# Patient Record
Sex: MALE | Race: BLACK OR AFRICAN AMERICAN | Employment: OTHER | URBAN - METROPOLITAN AREA
[De-identification: names, ages, dates, MRNs, and addresses within clinical notes are randomized per-mention and may not be internally consistent; named-entity substitution may affect disease eponyms.]

---

## 2022-02-21 ENCOUNTER — TELEPHONE (OUTPATIENT)
Dept: PHYSICAL THERAPY | Age: 62
End: 2022-02-21

## 2022-02-21 ENCOUNTER — APPOINTMENT (OUTPATIENT)
Dept: PHYSICAL THERAPY | Age: 62
End: 2022-02-21

## 2022-12-23 ENCOUNTER — HOSPITAL ENCOUNTER (OUTPATIENT)
Dept: PHYSICAL THERAPY | Age: 62
Discharge: HOME | End: 2022-12-23
Payer: OTHER GOVERNMENT

## 2022-12-23 PROCEDURE — 97110 THERAPEUTIC EXERCISES: CPT

## 2022-12-23 PROCEDURE — 97535 SELF CARE MNGMENT TRAINING: CPT

## 2022-12-23 PROCEDURE — 97162 PT EVAL MOD COMPLEX 30 MIN: CPT

## 2022-12-23 NOTE — PROGRESS NOTES
PT DAILY TREATMENT NOTE     Patient Name: Tj Joel  Date:2022  : 1960  [x]  Patient  Verified  Payor: PAM / Plan: Joey Shoemaker 74 / Product Type: Phuc Salazar /    In [id number]  Out WFM  Total Treatment Time (min): 40  Visit #: 1 of 8    Treatment Area: Pain in left knee [M25.562]    SUBJECTIVE  Pain Level (0-10 scale):  2  Any medication changes, allergies to medications, adverse drug reactions, diagnosis change, or new procedure performed?: [x] No    [] Yes (see summary sheet for update)  Subjective functional status/changes:   [] No changes reported       OBJECTIVE    Modality rationale:    Min Type Additional Details    [] Estim:  []Unatt       []IFC  []Premod                        []Other:  []w/ice   []w/heat  Position:  Location:    [] Estim: []Att    []TENS instruct  []NMES                    []Other:  []w/US   []w/ice   []w/heat  Position:  Location:    []  Traction: [] Cervical       []Lumbar                       [] Prone          []Supine                       []Intermittent   []Continuous Lbs:  [] before manual  [] after manual    []  Ultrasound: []Continuous   [] Pulsed                           []1MHz   []3MHz W/cm2:  Location:    []  Iontophoresis with dexamethasone         Location: [] Take home patch   [] In clinic    []  Ice     []  heat  []  Ice massage  []  Laser   []  Anodyne Position:  Location:    []  Laser with stim  []  Other:  Position:  Location:    []  Vasopneumatic Device    []  Right     []  Left  Pre-treatment girth:  Post-treatment girth:  Measured at (location):  Pressure:       [] lo [] med [] hi   Temperature: [] lo [] med [] hi   [] Skin assessment post-treatment:  []intact []redness- no adverse reaction    []redness - adverse reaction:     20 min [x]Eval                  []Re-Eval       12 min Therapeutic Exercise:  [] See flow sheet :HEP   Rationale: increase ROM and increase strength to improve the patients ability to perform ADL      8 min Self Care/Home Management: activity modification, pain managemeng   Rationale:  decrease strain and pain   to improve the patients ability to perform ADL          With   [] TE   [] TA   [] neuro   [] other: Patient Education: [x] Review HEP    [] Progressed/Changed HEP based on:   [] positioning   [] body mechanics   [] transfers   [] heat/ice application    [] other:      Other Objective/Functional Measures:       Pain Level (0-10 scale) post treatment: 2    ASSESSMENT/Changes in Function:      Patient will continue to benefit from skilled PT services to modify and progress therapeutic interventions, address functional mobility deficits, address ROM deficits, address strength deficits, analyze and address soft tissue restrictions, and analyze and cue movement patterns to attain remaining goals. [x]  See Plan of Care  []  See progress note/recertification  []  See Discharge Summary         Progress towards goals / Updated goals:  Short Term Goals: To be accomplished in 1 weeks:   1. The pt will be I and complaint with HEP    IE- issued HEP   Long Term Goals: To be accomplished in 4 weeks:   1. Improve FOTO score to 71 to improve ability for functional activities   IE- 65   2. Improve left glute max strength to 4+/5 to improve ability for functional activities   IE- 4-/5   3. The pt will report at least 50% improvement to improve ability for functional activities   IE- worsening. 4. The pt will demonstrate 4+/5 left glute med MMT for improved ability for gait. IE- 4/5   5.  The pt will report a little difficulty with walking a mile   IE- moderate    PLAN  []  Upgrade activities as tolerated     [x]  Continue plan of care  []  Update interventions per flow sheet       []  Discharge due to:_  []  Other:_      Juan rBeen, PT 12/23/2022  12:14 PM    Future Appointments   Date Time Provider Pa [place]   12/28/2022  3:00 PM Werner Sanders PTA [facility]   1/4/2023  5:30 PM Ruth Carmen Cortes, PTA [facility]   1/6/2023  7:00 AM Trenia Bracket, PTA [facility]   1/9/2023 11:30 AM Trenia Bracket, PTA [facility]   1/11/2023  5:00 PM Trenia Bracket, PTA [facility] [Patient Intake Form Reviewed] : Patient intake form was reviewed [As Noted in HPI] : as noted in HPI [Negative] : Heme/Lymph

## 2022-12-23 NOTE — PROGRESS NOTES
[facility]  [address] [facility] [address]  [place], [address]  [phone] fax    Plan of Care/ Statement of Necessity for Physical Therapy Services    Patient name: Aniceto López Start of Care: 2022   Referral source: Sherrel Jeans : 1960    Medical Diagnosis: Pain in left knee [M25.562]  Payor:  / Plan: Joey Shoemaker 76 / Product Type:  /  Onset [id number]    Treatment Diagnosis: left knee pain   Prior Hospitalization: see medical history Provider#: [id number]   Medications: Verified on Patient summary List    Comorbidities: back pain, GI disease, HTN, osteoporosis, headaches   Prior Level of Function: functionally I with all activities     The Plan of Care and following information is based on the information from the initial evaluation. Assessment/ key information:63 y/o male presents with c/o left knee pain that started about 1 year ago with no mechanism of injury. The pt reports pain increase with increased walking, stairs, squatting and lunging. Left knee AROM is WNL and equal to the right. MMT reveals weakness of B glute med, max and adductors. Limited flexibility is noted of B HS, quads, and hip flexors. + tenderness to palpation is noted over the patellar tendon and at the patellar pole. The pt will benefit from PT to address the aforementioned impairments. Evaluation Complexity History MEDIUM  Complexity : 1-2 comorbidities / personal factors will impact the outcome/ POC ; Examination HIGH Complexity : 4+ Standardized tests and measures addressing body structure, function, activity limitation and / or participation in recreation  ;Presentation MEDIUM Complexity : Evolving with changing characteristics  ; Clinical Decision Making MEDIUM Complexity : FOTO score of 26-74  Overall Complexity Rating: MEDIUM  Problem List: pain affecting function, decrease ROM, decrease strength, impaired gait/ balance, decrease ADL/ functional abilitiies, decrease activity tolerance, and decrease flexibility/ joint mobility   Treatment Plan may include any combination of the following: Therapeutic exercise, Neuromuscular reeducation, Manual therapy, Therapeutic activity, Self care/home management, Electric stim unattended , Ultrasound, and Electric stim attended  Patient / Family readiness to learn indicated by: asking questions, trying to perform skills, and interest  Persons(s) to be included in education: patient (P)  Barriers to Learning/Limitations: None  Patient Goal (s): To lessen the pain  Patient Self Reported Health Status: good  Rehabilitation Potential: good    Short Term Goals: To be accomplished in 1 weeks:   1. The pt will be I and complaint with HEP    IE- issued HEP   Long Term Goals: To be accomplished in 4 weeks:   1. Improve FOTO score to 71 to improve ability for functional activities   IE- 65   2. Improve left glute max strength to 4+/5 to improve ability for functional activities   IE- 4-/5   3. The pt will report at least 50% improvement to improve ability for functional activities   IE- worsening. 4. The pt will demonstrate 4+/5 left glute med MMT for improved ability for gait. IE- 4/5   5. The pt will report a little difficulty with walking a mile   IE- moderate    Frequency / Duration: Patient to be seen 2 times per week for 4 weeks. Patient/ Caregiver education and instruction: Diagnosis, prognosis, self care, activity modification, and exercises   [x]  Plan of care has been reviewed with LIO Fritz, PT 12/23/2022 12:17 PM    ________________________________________________________________________    I certify that the above Therapy Services are being furnished while the patient is under my care. I agree with the treatment plan and certify that this therapy is necessary.     Physician's Signature:____________Date:_________TIME:________     Elvia Ramos  ** Signature, Date and Time must be completed for valid certification **    Please sign and return to In [id number] [address]  [address] [facility] [address]  [place], [address]  [phone] fax

## 2022-12-28 ENCOUNTER — HOSPITAL ENCOUNTER (OUTPATIENT)
Dept: PHYSICAL THERAPY | Age: 62
Discharge: HOME | End: 2022-12-28
Payer: OTHER GOVERNMENT

## 2022-12-28 PROCEDURE — 97110 THERAPEUTIC EXERCISES: CPT

## 2022-12-28 PROCEDURE — 97112 NEUROMUSCULAR REEDUCATION: CPT

## 2022-12-28 PROCEDURE — 97140 MANUAL THERAPY 1/> REGIONS: CPT

## 2022-12-28 NOTE — PROGRESS NOTES
PT DAILY TREATMENT NOTE     Patient Name: Kemar Olivo  Date:2022  : 1960  [x]  Patient  Verified  Payor: PAM / Plan: Joey Shoemaker 74 / Product Type:  /    In time:3:00  Out time:3:43  Total Treatment Time (min): 43  Visit #: 2 of 8    Treatment Area: Pain in left knee [M25.562]    SUBJECTIVE  Pain Level (0-10 scale): 0.5/10  Any medication changes, allergies to medications, adverse drug reactions, diagnosis change, or new procedure performed?: [x] No    [] Yes (see summary sheet for update)  Subjective functional status/changes:   [x] No changes reported    OBJECTIVE    23 min Therapeutic Exercise:  [x] See flow sheet :   Rationale: increase ROM and increase strength to improve the patients ability to perform ADL's.     12 min Neuromuscular Re-education:  [x]  See flow sheet : Band-walks, dynamic step ups, KD dead lifts. Rationale: increase strength, improve coordination, and increase proprioception  to improve the patients ability to perform functional activities. 8 min Manual Therapy:  Graston technique to Left quads and patellar tendon, pin/stretch to Left quad. Pt seated. The manual therapy interventions were performed at a separate and distinct time from the therapeutic activities interventions. Rationale: decrease pain, increase ROM, increase tissue extensibility, and decrease trigger points to improve ease with negotiating up/down stairs. With   [x] TE   [] TA   [] neuro   [] other: Patient Education: [x] Review HEP    [] Progressed/Changed HEP based on:   [] positioning   [] body mechanics   [] transfers   [] heat/ice application    [] other:      Other Objective/Functional Measures: Initiated exercises per flow sheet. Pain Level (0-10 scale) post treatment: 0/10    ASSESSMENT/Changes in Function: First F/U visit. Pt fully participated in treatment. Responded well to treatment, reported a decrease in pain and tension. Reviewed HEP.  Continue PT to decrease mm/fascia restrictions and increase LE strength/stability to improve ease with performing functional activities. Patient will continue to benefit from skilled PT services to modify and progress therapeutic interventions, address functional mobility deficits, address ROM deficits, address strength deficits, analyze and address soft tissue restrictions, analyze and cue movement patterns, and analyze and modify body mechanics/ergonomics to attain remaining goals. [x]  See Plan of Care  []  See progress note/recertification  []  See Discharge Summary         Progress towards goals / Updated goals:  Short Term Goals: To be accomplished in 1 weeks:              1. The pt will be I and complaint with HEP               IE- issued HEP    Current: Progressing. 12/28/2022  Long Term Goals: To be accomplished in 4 weeks:              1. Improve FOTO score to 71 to improve ability for functional activities              IE- 65              2. Improve left glute max strength to 4+/5 to improve ability for functional activities              IE- 4-/5              3. The pt will report at least 50% improvement to improve ability for functional activities              IE- worsening. 4. The pt will demonstrate 4+/5 left glute med MMT for improved ability for gait.                IE- 4/5              5. The pt will report a little difficulty with walking a mile              IE- moderate    PLAN  []  Upgrade activities as tolerated     [x]  Continue plan of care  []  Update interventions per flow sheet       []  Discharge due to:_  []  Other:_      Bob Parkinson PTA 12/28/2022  2:59 PM    Future Appointments   Date Time Provider Pa [place]   12/28/2022  3:00 PM Justine Mendiola PTA [facility]   1/4/2023  5:30 PM Justine Mendiola PTA [facility]   1/6/2023  7:00 AM Justine Mendiola PTA [facility]   1/9/2023 11:30 AM Justine Mendiola PTA [facility]   1/11/2023  5:00 PM [facility] Car[facility]

## 2023-01-04 ENCOUNTER — HOSPITAL ENCOUNTER (OUTPATIENT)
Dept: PHYSICAL THERAPY | Age: 63
Discharge: HOME | End: 2023-01-04
Payer: OTHER GOVERNMENT

## 2023-01-04 PROCEDURE — 97140 MANUAL THERAPY 1/> REGIONS: CPT

## 2023-01-04 PROCEDURE — 97110 THERAPEUTIC EXERCISES: CPT

## 2023-01-04 PROCEDURE — 97112 NEUROMUSCULAR REEDUCATION: CPT

## 2023-01-04 NOTE — PROGRESS NOTES
PT DAILY TREATMENT NOTE     Patient Name: Michelle Sue  Date:2023  : 1960  [x]  Patient  Verified  Payor: PAM / Plan: Joey Shoemaker 74 / Product Type: Aleena Joshua /    In time:5:31  Out time:6:16  Total Treatment Time (min): 45  Visit #: 3 of 8    Treatment Area: Pain in left knee [M25.562]    SUBJECTIVE  Pain Level (0-10 scale): 0/10  Any medication changes, allergies to medications, adverse drug reactions, diagnosis change, or new procedure performed?: [x] No    [] Yes (see summary sheet for update)  Subjective functional status/changes:   [] No changes reported  \"I felt pretty good until I did some yard work on Monday. It's store today. \"    OBJECTIVE    25 min Therapeutic Exercise:  [x] See flow sheet :   Rationale: increase ROM and increase strength to improve the patients ability to perform ADL's.    12 min Neuromuscular Re-education:  [x]  See flow sheet : Band-walks, dynamic step ups, KD dead lifts. Rationale: increase strength, improve coordination, and increase proprioception  to improve the patients ability to perform functional activities. 8 min Manual Therapy:  Graston technique to Left quads and patellar tendon, pin/stretch to Left quad. Pt seated. The manual therapy interventions were performed at a separate and distinct time from the therapeutic activities interventions. Rationale: decrease pain, increase ROM, increase tissue extensibility, and decrease trigger points to improve ease with performing functional activities. With   [x] TE   [] TA   [] neuro   [] other: Patient Education: [x] Review HEP    [] Progressed/Changed HEP based on:   [] positioning   [] body mechanics   [] transfers   [] heat/ice application    [] other:      Other Objective/Functional Measures: No change in there ex. Pt presented with increased Left knee pain, pt attributes increased pain to performing yard work on Monday. Added CoreAlign series per flow sheet.     Pain Level (0-10 scale) post treatment: 0/10    ASSESSMENT/Changes in Function: Pain increased slightly with end range ext on shuttle press and with bottom aspect of dead lifts, denied sharp pain. Denied pain post-manual. Continue PT to increase strength and stability and decrease mm restrictions to improve ease with performing functional activities. Patient will continue to benefit from skilled PT services to modify and progress therapeutic interventions, address functional mobility deficits, address ROM deficits, address strength deficits, analyze and address soft tissue restrictions, analyze and cue movement patterns, and analyze and modify body mechanics/ergonomics to attain remaining goals. [x]  See Plan of Care  []  See progress note/recertification  []  See Discharge Summary         Progress towards goals / Updated goals:  Short Term Goals: To be accomplished in 1 weeks:              1. The pt will be I and complaint with HEP               IE- issued HEP               Current: Progressing. 12/28/2022  Long Term Goals: To be accomplished in 4 weeks:              1. Improve FOTO score to 71 to improve ability for functional activities              IE- 65              2. Improve left glute max strength to 4+/5 to improve ability for functional activities              IE- 4-/5              3. The pt will report at least 50% improvement to improve ability for functional activities              IE- worsening. 4. The pt will demonstrate 4+/5 left glute med MMT for improved ability for gait.                IE- 4/5              5. The pt will report a little difficulty with walking a mile              IE- moderate    PLAN  []  Upgrade activities as tolerated     [x]  Continue plan of care  []  Update interventions per flow sheet       []  Discharge due to:_  []  Other:_      Diane Richardson PTA 1/4/2023  5:39 PM    Future Appointments   Date Time Provider Pa Corona   1/6/2023  7:00 AM Juan F Carter [facility]   1/9/2023 11:30 AM Juan F Carter PTA [facility]   1/11/2023  5:00 PM Juan F Carter PTA [facility]

## 2023-01-06 ENCOUNTER — HOSPITAL ENCOUNTER (OUTPATIENT)
Dept: PHYSICAL THERAPY | Age: 63
Discharge: HOME | End: 2023-01-06
Payer: OTHER GOVERNMENT

## 2023-01-06 PROCEDURE — 97110 THERAPEUTIC EXERCISES: CPT

## 2023-01-06 PROCEDURE — 97140 MANUAL THERAPY 1/> REGIONS: CPT

## 2023-01-06 PROCEDURE — 97112 NEUROMUSCULAR REEDUCATION: CPT

## 2023-01-06 NOTE — PROGRESS NOTES
PT DAILY TREATMENT NOTE     Patient Name: Elisabet Temple  Date:2023  : 1960  [x]  Patient  Verified  Payor: PAM / Plan: Joey Shoemaker 74 / Product Type:  /    In time:6:56  Out time:7:35  Total Treatment Time (min): 39  Visit #: 4 of 8    Treatment Area: Pain in left knee [M25.562]    SUBJECTIVE  Pain Level (0-10 scale): 0/10  Any medication changes, allergies to medications, adverse drug reactions, diagnosis change, or new procedure performed?: [x] No    [] Yes (see summary sheet for update)  Subjective functional status/changes:   [] No changes reported  \"No pain this morning. Felt pretty good after the other day. \"    OBJECTIVE    19 min Therapeutic Exercise:  [x] See flow sheet :   Rationale: increase ROM and increase strength to improve the patients ability to perform ADL's.    12 min Neuromuscular Re-education:  [x]  See flow sheet : Band-walks, dynamic step ups, KD dead lifts. Rationale: increase strength, improve coordination, and increase proprioception  to improve the patients ability to perform functional activities. 8 min Manual Therapy:  Graston technique to Left quads and patellar tendon, pin/stretch to Left quad. Pt seated. The manual therapy interventions were performed at a separate and distinct time from the therapeutic activities interventions. Rationale: decrease pain, increase ROM, increase tissue extensibility, and decrease trigger points to improve ease with performing functional activities. With   [x] TE   [] TA   [] neuro   [] other: Patient Education: [x] Review HEP    [] Progressed/Changed HEP based on:   [] positioning   [] body mechanics   [] transfers   [] heat/ice application    [] other:      Other Objective/Functional Measures: Increased shuttle press to 2 sets, increased step up height to 6\". Increased kettlebell lifts to 15 reps. MMT Left glute med 4+/5.     Pain Level (0-10 scale) post treatment: 0/10    ASSESSMENT/Changes in Function: Pt is making gradual progress, noted improved Left glute med strength and improved exercise tolerance. Expressed slight discomfort with deadlifts only. Continue PT to further increase strength and and eccentric control to improve ease with performing functional activities. Patient will continue to benefit from skilled PT services to modify and progress therapeutic interventions, address functional mobility deficits, address ROM deficits, address strength deficits, analyze and address soft tissue restrictions, analyze and cue movement patterns, and analyze and modify body mechanics/ergonomics to attain remaining goals. [x]  See Plan of Care  []  See progress note/recertification  []  See Discharge Summary         Progress towards goals / Updated goals:  Short Term Goals: To be accomplished in 1 weeks:              1. The pt will be I and complaint with HEP               IE- issued HEP               Current: Progressing. 12/28/2022  Long Term Goals: To be accomplished in 4 weeks:              1. Improve FOTO score to 71 to improve ability for functional activities              IE- 65              2. Improve left glute max strength to 4+/5 to improve ability for functional activities              IE- 4-/5              3. The pt will report at least 50% improvement to improve ability for functional activities              IE- worsening. 4. The pt will demonstrate 4+/5 left glute med MMT for improved ability for gait.                IE- 4/5   Current: 4+/5. 1/6/2023              5. The pt will report a little difficulty with walking a mile              IE- moderate    PLAN  []  Upgrade activities as tolerated     [x]  Continue plan of care  []  Update interventions per flow sheet       []  Discharge due to:_  []  Other:_      Brenda Schmdit PTA 1/6/2023  7:01 AM    Future Appointments   Date Time Provider Pa oCrona   1/9/2023 11:30 AM Miguel Angel Urias PTA [facility] 1/11/2023  5:00 PM Guy Miranda, PTA [facility]

## 2023-01-09 ENCOUNTER — HOSPITAL ENCOUNTER (OUTPATIENT)
Dept: PHYSICAL THERAPY | Age: 63
Discharge: HOME | End: 2023-01-09
Payer: OTHER GOVERNMENT

## 2023-01-09 PROCEDURE — 97140 MANUAL THERAPY 1/> REGIONS: CPT

## 2023-01-09 PROCEDURE — 97110 THERAPEUTIC EXERCISES: CPT

## 2023-01-09 PROCEDURE — 97112 NEUROMUSCULAR REEDUCATION: CPT

## 2023-01-09 NOTE — PROGRESS NOTES
PT DAILY TREATMENT NOTE     Patient Name: Haja Mayo  Date:2023  : 1960  [x]  Patient  Verified  Payor:  / Plan: [facility]  [facility] REGION / Product Type:  /    In time:11:30  Out time:12:10  Total Treatment Time (min): 40  Visit #: 5 of 8    Treatment Area: Pain in left knee [M25.562]    SUBJECTIVE  Pain Level (0-10 scale): 1/10  Any medication changes, allergies to medications, adverse drug reactions, diagnosis change, or new procedure performed?: [x] No    [] Yes (see summary sheet for update)  Subjective functional status/changes:   [] No changes reported  \"It's doing better. Only really have pain when I'm going up the stairs now. \"    OBJECTIVE    20 min Therapeutic Exercise:  [x] See flow sheet :   Rationale: increase ROM and increase strength to improve the patients ability to perform ADL's.    12 min Neuromuscular Re-education:  [x]  See flow sheet : Band-walks, dynamic step ups, KD dead lifts, 50% lunge   Rationale: increase strength, improve coordination, and increase proprioception  to improve the patients ability to perform functional activities. 8 min Manual Therapy:  Graston technique to Left quads and patellar tendon, pin/stretch to Left quad. Pt seated. The manual therapy interventions were performed at a separate and distinct time from the therapeutic activities interventions. Rationale: decrease pain, increase ROM, increase tissue extensibility, and decrease trigger points to improve ease with negotiating up/down stairs. With   [x] TE   [] TA   [] neuro   [] other: Patient Education: [x] Review HEP    [] Progressed/Changed HEP based on:   [] positioning   [] body mechanics   [] transfers   [] heat/ice application    [] other:      Other Objective/Functional Measures: Added 50% ROM lunge. Increased band-walks to green resistance. Pt reports 25-30% overall improvement and reports \"a little bit\" of difficulty walking 1 mile.     Pain Level (0-10 scale) post treatment: 0/10    ASSESSMENT/Changes in Function: Pt is making gradual progress, demonstrates improved strength and reports improved walking tolerance. Continues to have mild discomfort when negotiating up stairs. Patient will continue to benefit from skilled PT services to modify and progress therapeutic interventions, address functional mobility deficits, address ROM deficits, address strength deficits, analyze and address soft tissue restrictions, analyze and cue movement patterns, and analyze and modify body mechanics/ergonomics to attain remaining goals. [x]  See Plan of Care  []  See progress note/recertification  []  See Discharge Summary         Progress towards goals / Updated goals:  Short Term Goals: To be accomplished in 1 weeks:              1. The pt will be I and complaint with HEP               IE- issued HEP               Current: Progressing. 12/28/2022  Long Term Goals: To be accomplished in 4 weeks:              1. Improve FOTO score to 71 to improve ability for functional activities              IE- 65              2. Improve left glute max strength to 4+/5 to improve ability for functional activities              IE- 4-/5              3. The pt will report at least 50% improvement to improve ability for functional activities              IE- worsening. Current: Pt reports 25-30% overall improvement. 1/9/2023               4. The pt will demonstrate 4+/5 left glute med MMT for improved ability for gait. IE- 4/5              Current: 4+/5. 1/6/2023              5. The pt will report a little difficulty with walking a mile              IE- moderate   Current: Reports \"a little bit\" of difficulty walking 1 mile.  1/9/2023    PLAN  []  Upgrade activities as tolerated     [x]  Continue plan of care  []  Update interventions per flow sheet       []  Discharge due to:_  []  Other:_      Robert Hudson, LIO 1/9/2023  11:39 AM    Future Appointments   Date Time Provider Pa [place]   1/11/2023  5:00 PM Marie Gale, PTA [facility]

## 2023-01-11 ENCOUNTER — HOSPITAL ENCOUNTER (OUTPATIENT)
Dept: PHYSICAL THERAPY | Age: 63
Discharge: HOME | End: 2023-01-11
Payer: OTHER GOVERNMENT

## 2023-01-11 PROCEDURE — 97110 THERAPEUTIC EXERCISES: CPT

## 2023-01-11 PROCEDURE — 97112 NEUROMUSCULAR REEDUCATION: CPT

## 2023-01-11 PROCEDURE — 97140 MANUAL THERAPY 1/> REGIONS: CPT

## 2023-01-11 NOTE — PROGRESS NOTES
PT DAILY TREATMENT NOTE     Patient Name: Manuel Hdz  Date:2023  : 1960  [x]  Patient  Verified  Payor:  / Plan: [facility]  [facility] REGION / Product Type:  /    In time:4:58  Out time:5:40  Total Treatment Time (min): 42  Visit #: 6 of 8    Treatment Area: Pain in left knee [M25.562]    SUBJECTIVE  Pain Level (0-10 scale): 0/10  Any medication changes, allergies to medications, adverse drug reactions, diagnosis change, or new procedure performed?: [x] No    [] Yes (see summary sheet for update)  Subjective functional status/changes:   [] No changes reported  \"Feeling pretty good today. \"    OBJECTIVE    22 min Therapeutic Exercise:  [x] See flow sheet :   Rationale: increase ROM and increase strength to improve the patients ability to perform ADL's.    12 min Neuromuscular Re-education:  [x]  See flow sheet : Band-walks, dynamic step ups, KD dead lifts, 50% lunge   Rationale: increase strength, improve coordination, and increase proprioception  to improve the patients ability to perform functional activities. 8 min Manual Therapy:  Graston technique to Left quads and patellar tendon, pin/stretch to Left quad. Pt seated. The manual therapy interventions were performed at a separate and distinct time from the therapeutic activities interventions. Rationale: decrease pain, increase ROM, increase tissue extensibility, and decrease trigger points to improve ease with performing functional activities and negotiating stairs. With   [x] TE   [] TA   [] neuro   [] other: Patient Education: [x] Review HEP    [] Progressed/Changed HEP based on:   [] positioning   [] body mechanics   [] transfers   [] heat/ice application    [] other:      Other Objective/Functional Measures: Increased CoreAlign resistance to 2gray/2blue. MMT Left glute max 4+/5. Pain Level (0-10 scale) post treatment: 0/10    ASSESSMENT/Changes in Function: Met LTG #2. Pt denied pain throughout treatment.  Pt has made significant progress since [facility]. Plan to continue with 1-2 F/U's and D/C to updated HEP. Patient will continue to benefit from skilled PT services to modify and progress therapeutic interventions, address functional mobility deficits, address ROM deficits, address strength deficits, analyze and address soft tissue restrictions, analyze and cue movement patterns, and analyze and modify body mechanics/ergonomics to attain remaining goals. [x]  See Plan of Care  []  See progress note/recertification  []  See Discharge Summary         Progress towards goals / Updated goals:  Short Term Goals: To be accomplished in 1 weeks:              1. The pt will be I and complaint with HEP               IE- issued HEP               Current: Progressing. 12/28/2022  Long Term Goals: To be accomplished in 4 weeks:              1. Improve FOTO score to 71 to improve ability for functional activities              IE- 65              2. Improve left glute max strength to 4+/5 to improve ability for functional activities              IE- 4-/5   Current: Met, 4+/5 1/11/2023              3. The pt will report at least 50% improvement to improve ability for functional activities              IE- worsening. Current: Pt reports 25-30% overall improvement. 1/9/2023               4. The pt will demonstrate 4+/5 left glute med MMT for improved ability for gait. IE- 4/5              Current: 4+/5. 1/6/2023              5. The pt will report a little difficulty with walking a mile              IE- moderate              Current: Reports \"a little bit\" of difficulty walking 1 mile.  1/9/2023       PLAN  []  Upgrade activities as tolerated     [x]  Continue plan of care  []  Update interventions per flow sheet       []  Discharge due to:_  []  Other:_      Fernando Saul PTA 1/11/2023  4:54 PM    Future Appointments   Date Time Provider Pa Corona   1/11/2023  5:00 PM Lula Townsend PTA [facility]

## 2023-01-19 ENCOUNTER — HOSPITAL ENCOUNTER (OUTPATIENT)
Dept: PHYSICAL THERAPY | Age: 63
Discharge: HOME | End: 2023-01-19
Payer: OTHER GOVERNMENT

## 2023-01-19 PROCEDURE — 97140 MANUAL THERAPY 1/> REGIONS: CPT

## 2023-01-19 PROCEDURE — 97110 THERAPEUTIC EXERCISES: CPT

## 2023-01-19 PROCEDURE — 97112 NEUROMUSCULAR REEDUCATION: CPT

## 2023-01-19 NOTE — PROGRESS NOTES
PT DAILY TREATMENT NOTE     Patient Name: Farzaneh Miranda  Date:2023  : 1960  [x]  Patient  Verified  Payor:  / Plan: [facility]  [facility] REGION / Product Type:  /    In time:7:00  Out time:7:35  Total Treatment Time (min): 35  Visit #: 7 of 8    Treatment Area: Pain in left knee [M25.562]    SUBJECTIVE  Pain Level (0-10 scale): 0/10  Any medication changes, allergies to medications, adverse drug reactions, diagnosis change, or new procedure performed?: [x] No    [] Yes (see summary sheet for update)  Subjective functional status/changes:   [] No changes reported  \"The knee is feeling pretty good. \"    OBJECTIVE    15 min Therapeutic Exercise:  [x] See flow sheet :   Rationale: increase ROM and increase strength to improve the patients ability to perform ADL's.    12 min Neuromuscular Re-education:  [x]  See flow sheet : Band-walks, dynamic step ups, KD dead lifts, 50% lunge   Rationale: increase strength, improve coordination, and increase proprioception  to improve the patients ability to perform functional activities. 8 min Manual Therapy:  Graston technique to Left quads and patellar tendon, pin/stretch to Left quad. Pt seated. The manual therapy interventions were performed at a separate and distinct time from the therapeutic activities interventions. Rationale: decrease pain, increase ROM, increase tissue extensibility, and decrease trigger points to improve ease with performing functional activities. With   [x] TE   [] TA   [] neuro   [] other: Patient Education: [x] Review HEP    [] Progressed/Changed HEP based on:   [] positioning   [] body mechanics   [] transfers   [] heat/ice application    [] other:      Other Objective/Functional Measures: FOTO 82%. Pt reports 50% overall improvement. Pain Level (0-10 scale) post treatment: 0/10    ASSESSMENT/Changes in Function: Pt has made considerable progress since [facility].  Pt stated \"I've been doing the exercises at home and haven't really had any trouble. \" Demonstrates good mechanics with exercises and denied pain throughout treatment. Pt met all LTG's. Pt is in agreement with D/C. Instructed to continue HEP and to gradually increase reps/resistance as tolerable. Pt thanked staff for services and had no further questions or concerns. []  See Plan of Care  []  See progress note/recertification  [x]  See Discharge Summary         Progress towards goals / Updated goals:  Short Term Goals: To be accomplished in 1 weeks:              1. The pt will be I and complaint with HEP               IE- issued HEP               Current: Progressing. 12/28/2022; Met, pt reports compliance. 1/19/2023  Long Term Goals: To be accomplished in 4 weeks:              1. Improve FOTO score to 71 to improve ability for functional activities              IE- 65   Current: Met, 82%. 1/19/2023              2. Improve left glute max strength to 4+/5 to improve ability for functional activities              IE- 4-/5              Current: Met, 4+/5 1/11/2023              3. The pt will report at least 50% improvement to improve ability for functional activities              IE- worsening. Current: Pt reports 25-30% overall improvement. 1/9/2023; Met, pt reports 50% improvement. 1/19/2023               4. The pt will demonstrate 4+/5 left glute med MMT for improved ability for gait. IE- 4/5              Current: Met, 4+/5. 1/6/2023              5. The pt will report a little difficulty with walking a mile              IE- moderate              Current: Reports \"a little bit\" of difficulty walking 1 mile. 1/9/2023; Met, pt stated \"the way I feel now I don't think I would have any trouble. \" 1/19/2023    PLAN  []  Upgrade activities as tolerated     []  Continue plan of care  []  Update interventions per flow sheet       [x]  Discharge due to: Met most LTG's, D/C to HEP.   []  Other:_      Diane Richardson, PTA 1/19/2023  6:59 AM    Future Appointments   Date Time Provider Pa Corona   1/19/2023  7:00 AM Corina Paul, PTA [facility]

## 2023-01-19 NOTE — PROGRESS NOTES
[facility]  [address] [facility] [address]  [place], [address]  [phone] fax    Physical Therapy Discharge Summary  Patient name: Leny Martinez Start of Care: 2022   Referral source: Ryan Hunter : 1960   Medical/Treatment Diagnosis: Pain in left knee [M25.562]  Payor:  / Plan: [facility] / Product Type:  /  Onset [id number]     Prior Hospitalization: see medical history Provider#: [id number]   Medications: Verified on Patient Summary List    Comorbidities: back pain, GI disease, HTN, osteoporosis, headaches  Prior Level of Function:functionally I with all activities  Visits from Start of Care: 7    Missed Visits: 0  Reporting Period : 2022 to 2023      Summary of Care:    Progress towards goals:  Short Term Goals: To be accomplished in 1 weeks:              1. The pt will be I and complaint with HEP               IE- issued HEP               Current: Met, pt repots compliance. Long Term Goals: To be accomplished in 4 weeks:              1. Improve FOTO score to 71 to improve ability for functional activities              IE- 65              Current: Met, 82%. 2. Improve left glute max strength to 4+/5 to improve ability for functional activities              IE- 4-/5              Current: Met, 4+/5              3. The pt will report at least 50% improvement to improve ability for functional activities              IE- worsening. Current: Met, pt reports 50% improvement. 4. The pt will demonstrate 4+/5 left glute med MMT for improved ability for gait. IE- 4/5              Current: Met, 4+/5. 2023              5. The pt will report a little difficulty with walking a mile              IE- moderate              Current: Met, pt stated \"the way I feel now I don't think I would have any trouble. \"    ASSESSMENT/RECOMMENDATIONS:  Pt has made considerable progress since [facility].  Pt stated \"I've been doing the exercises at home and haven't really had any trouble. \" Demonstrates good mechanics with exercises and denied pain throughout treatment. Pt met all LTG's. Pt is in agreement with D/C. Instructed to continue HEP and to gradually increase reps/resistance as tolerable. Pt thanked staff for services and had no further questions or concerns.   [x]Discontinue therapy: [x]Patient has reached or is progressing toward set goals      []Patient is non-compliant or has abdicated      []Due to lack of appreciable progress towards set goals    Xin Graham, PTA 1/19/2023 7:45 AM

## 2024-07-26 ENCOUNTER — OFFICE VISIT (OUTPATIENT)
Age: 64
End: 2024-07-26
Payer: OTHER GOVERNMENT

## 2024-07-26 VITALS
SYSTOLIC BLOOD PRESSURE: 122 MMHG | TEMPERATURE: 98.3 F | DIASTOLIC BLOOD PRESSURE: 78 MMHG | HEART RATE: 75 BPM | HEIGHT: 66 IN | WEIGHT: 170 LBS | OXYGEN SATURATION: 98 % | BODY MASS INDEX: 27.32 KG/M2

## 2024-07-26 DIAGNOSIS — H81.10 BENIGN PAROXYSMAL POSITIONAL VERTIGO, UNSPECIFIED LATERALITY: ICD-10-CM

## 2024-07-26 DIAGNOSIS — G47.33 OSA (OBSTRUCTIVE SLEEP APNEA): ICD-10-CM

## 2024-07-26 DIAGNOSIS — G43.009 MIGRAINE WITHOUT AURA AND WITHOUT STATUS MIGRAINOSUS, NOT INTRACTABLE: Primary | ICD-10-CM

## 2024-07-26 PROCEDURE — 99204 OFFICE O/P NEW MOD 45 MIN: CPT | Performed by: STUDENT IN AN ORGANIZED HEALTH CARE EDUCATION/TRAINING PROGRAM

## 2024-07-26 RX ORDER — PSEUDOEPHEDRINE HCL 30 MG
100 TABLET ORAL
COMMUNITY
Start: 2024-03-13

## 2024-07-26 RX ORDER — SUMATRIPTAN 50 MG/1
50 TABLET, FILM COATED ORAL
Qty: 9 TABLET | Refills: 3 | Status: SHIPPED | OUTPATIENT
Start: 2024-07-26 | End: 2024-07-26

## 2024-07-26 RX ORDER — AMITRIPTYLINE HYDROCHLORIDE 25 MG/1
25 TABLET, FILM COATED ORAL NIGHTLY
Qty: 30 TABLET | Refills: 3 | Status: SHIPPED | OUTPATIENT
Start: 2024-07-26 | End: 2024-08-25

## 2024-07-26 RX ORDER — TAMSULOSIN HYDROCHLORIDE 0.4 MG/1
0.4 CAPSULE ORAL DAILY
COMMUNITY
Start: 2024-03-13 | End: 2025-03-13

## 2024-07-26 RX ORDER — FLUTICASONE PROPIONATE 50 MCG
1 SPRAY, SUSPENSION (ML) NASAL DAILY
COMMUNITY
Start: 2024-06-26 | End: 2025-06-26

## 2024-07-26 RX ORDER — CALCIUM CARBONATE/VITAMIN D3 600 MG-10
TABLET ORAL
COMMUNITY
Start: 2024-06-04

## 2024-07-26 RX ORDER — CARBOXYMETHYLCELLULOSE SODIUM 5 MG/ML
SOLUTION/ DROPS OPHTHALMIC
COMMUNITY
Start: 2024-05-21

## 2024-07-26 RX ORDER — PRAVASTATIN SODIUM 10 MG
10 TABLET ORAL DAILY
COMMUNITY
Start: 2023-11-20 | End: 2024-11-19

## 2024-07-26 RX ORDER — CYCLOSPORINE 0.5 MG/ML
EMULSION OPHTHALMIC
COMMUNITY
Start: 2024-07-22

## 2024-07-26 RX ORDER — SILDENAFIL 100 MG/1
100 TABLET, FILM COATED ORAL PRN
COMMUNITY
Start: 2013-07-10 | End: 2024-12-28

## 2024-07-26 ASSESSMENT — ENCOUNTER SYMPTOMS
NAUSEA: 1
COUGH: 0
VOMITING: 0
SHORTNESS OF BREATH: 0
BACK PAIN: 1

## 2024-07-26 NOTE — PROGRESS NOTES
600-10 MG-MCG TABS per tab       REFRESH PLUS 0.5 % SOLN ophthalmic solution       cycloSPORINE (RESTASIS) 0.05 % ophthalmic emulsion       docusate (COLACE, DULCOLAX) 100 MG CAPS Take 100 mg by mouth      pravastatin (PRAVACHOL) 10 MG tablet Take 1 tablet by mouth daily      tamsulosin (FLOMAX) 0.4 MG capsule Take 1 capsule by mouth daily      fluticasone (FLONASE) 50 MCG/ACT nasal spray 1 spray by Nasal route daily      sildenafil (VIAGRA) 100 MG tablet Take 1 tablet by mouth as needed      amitriptyline (ELAVIL) 25 MG tablet Take 1 tablet by mouth nightly 30 tablet 3    SUMAtriptan (IMITREX) 50 MG tablet Take 1 tablet by mouth once as needed for Migraine Can repeat dose in 2 hour if no change; not to be taken more than 2 times in 24 hours and not ore than 2 days a week. 9 tablet 3    aspirin 81 MG EC tablet Take 1 tablet by mouth daily      hydroCHLOROthiazide (HYDRODIURIL) 25 MG tablet Take 1 tablet by mouth daily      lisinopril (PRINIVIL;ZESTRIL) 10 MG tablet Take 1 tablet by mouth daily       No current facility-administered medications for this visit.       Physical Exam  Constitutional:       Appearance: Normal appearance.   HENT:      Head: Normocephalic and atraumatic.      Mouth/Throat:      Mouth: Mucous membranes are moist.      Pharynx: Oropharynx is clear. No oropharyngeal exudate.   Eyes:      Extraocular Movements: Extraocular movements intact.      Pupils: Pupils are equal, round, and reactive to light.   Pulmonary:      Effort: Pulmonary effort is normal. No respiratory distress.   Musculoskeletal:         General: Normal range of motion.      Cervical back: Normal range of motion and neck supple.      Right lower leg: No edema.      Left lower leg: No edema.   Neurological:      Mental Status: He is alert.      Comments: Mental status: Awake, alert, oriented , follows simple and complex commands, no neglect, no extinction.   Speech and languge: fluent, coherent,   and comprehension intact  CN:

## 2024-07-30 ENCOUNTER — TELEPHONE (OUTPATIENT)
Age: 64
End: 2024-07-30

## 2024-07-30 NOTE — TELEPHONE ENCOUNTER
----- Message from MELQUIADES Bocanegra NP sent at 7/29/2024 10:31 PM EDT -----  Regarding: RE: Elavil and Fexofenadine  Contact: [phone]  No, but they can both potentially cause some drowsiness/sleepiness.   ----- Message -----  From: Eli Hoffman LPN  Sent: 7/29/2024   3:29 PM EDT  To: MELQUIADES Bocanegra NP  Subject: FW: Elavil and Fexofenadine                      Please see request from patient.  Thank You      ----- Message -----  From: Vivek Raymond  Sent: 7/28/2024   7:14 PM EDT  To: [facility] Clinical Staff  Subject: Elavil and Fexofenadine                          Mark Louis,    I have a question for you regarding taking the Elavil. I’m currently taking 180 mg of Fexofenadine as required for allergies. Will taking Fexofenadine affect the Elavil in any way?  Thank you.     Vivek Raymond

## 2024-07-30 NOTE — TELEPHONE ENCOUNTER
Call placed to the patient, name and  verified.  Patient was given message from ASAEL Yadav's note.(See note)  Patient had no questions.

## 2024-10-01 PROBLEM — I10 ESSENTIAL HYPERTENSION: Status: ACTIVE | Noted: 2024-10-01

## 2024-10-01 PROBLEM — G43.009 MIGRAINE WITHOUT AURA AND WITHOUT STATUS MIGRAINOSUS, NOT INTRACTABLE: Status: ACTIVE | Noted: 2024-10-01

## 2024-10-01 NOTE — PROGRESS NOTES
[facility]   Sleep Medicine     Office Progress Note - Initial Evaluation        [address]  [address]  [facility] [address] [phone] Fax    Reason for visit/referral: Evaluation for possible obstructive sleep apnea/sleep disordered breathing  Assessment:      1. Suspected sleep apnea  -     Polysomnography (13834); Future  2. Loud snoring  -     Polysomnography (11598); Future  3. Bruxism, sleep-related  4. Waking at night short of breath  Comments:  Per his wife's comments that he related today  5. Witnessed apneic spells  6. Restless legs syndrome (RLS)  Comments:  Possible, will follow-up on this after he returns and presumably after treating his obstructive sleep apnea  7. Non-restorative sleep  8. Excessive daytime sleepiness  9. Essential hypertension  Assessment & Plan:   Chronic, at goal (stable), continue current treatment plan  10. Migraine without aura and without status migrainosus, not intractable  Assessment & Plan:   Monitored by specialist- no acute findings meriting change in the plan, taking Imitrex and Elavil, which helps.  He also does wake up with headaches on some days which may be unrelated to his migraine history and more related to obstructive sleep apnea possibly.       I have discussed the nature and definition of obstructive sleep apnea and why it would be important to evaluate for this.  We did discuss how undiagnosed/untreated obstructive sleep apnea can affect other medical conditions/disorders, and in particular, cardiovascular disorders.  The consequences of untreated obstructive sleep apnea include the increased risk of stroke, heart disease, hypertension, cognitive difficulties (attention, memory), possible endocrine difficulties to include insulin resistance and possible increased risk of diabetes, increased sleepiness and fatigue and increased risk of motor vehicle accidents.    We discussed the different types of sleep

## 2024-10-01 NOTE — ASSESSMENT & PLAN NOTE
Monitored by specialist- no acute findings meriting change in the plan, taking Imitrex and Elavil, which helps.  He also does wake up with headaches on some days which may be unrelated to his migraine history and more related to obstructive sleep apnea possibly.

## 2024-10-01 NOTE — PATIENT INSTRUCTIONS
device     Safety is strongly encouraged.  You should not drive if sleepy, tired, distracted and/or fatigued.      Chest Xrays and blood work do not require appointments.  They are considered \"Walk-In\" services and can be obtained at either [facility] or [facility].  Blood work is performed at the Laboratory (Lab) from 08:00am - 04:00 pm (if applicable to your visit)

## 2024-10-02 ENCOUNTER — OFFICE VISIT (OUTPATIENT)
Age: 64
End: 2024-10-02
Payer: OTHER GOVERNMENT

## 2024-10-02 VITALS
TEMPERATURE: 98.2 F | BODY MASS INDEX: 26.95 KG/M2 | HEART RATE: 84 BPM | WEIGHT: 167 LBS | RESPIRATION RATE: 18 BRPM | DIASTOLIC BLOOD PRESSURE: 75 MMHG | SYSTOLIC BLOOD PRESSURE: 121 MMHG | OXYGEN SATURATION: 99 %

## 2024-10-02 DIAGNOSIS — R06.83 LOUD SNORING: ICD-10-CM

## 2024-10-02 DIAGNOSIS — G47.19 EXCESSIVE DAYTIME SLEEPINESS: ICD-10-CM

## 2024-10-02 DIAGNOSIS — G43.009 MIGRAINE WITHOUT AURA AND WITHOUT STATUS MIGRAINOSUS, NOT INTRACTABLE: ICD-10-CM

## 2024-10-02 DIAGNOSIS — I10 ESSENTIAL HYPERTENSION: ICD-10-CM

## 2024-10-02 DIAGNOSIS — R06.81 WITNESSED APNEIC SPELLS: ICD-10-CM

## 2024-10-02 DIAGNOSIS — G47.63 BRUXISM, SLEEP-RELATED: ICD-10-CM

## 2024-10-02 DIAGNOSIS — G47.8 NON-RESTORATIVE SLEEP: ICD-10-CM

## 2024-10-02 DIAGNOSIS — G25.81 RESTLESS LEGS SYNDROME (RLS): ICD-10-CM

## 2024-10-02 DIAGNOSIS — R29.818 SUSPECTED SLEEP APNEA: Primary | ICD-10-CM

## 2024-10-02 DIAGNOSIS — R06.02 WAKING AT NIGHT SHORT OF BREATH: ICD-10-CM

## 2024-10-02 PROBLEM — H04.129 TEAR FILM INSUFFICIENCY: Status: ACTIVE | Noted: 2024-10-02

## 2024-10-02 PROBLEM — R73.03 PREDIABETES: Status: ACTIVE | Noted: 2024-10-02

## 2024-10-02 PROBLEM — G47.33 OBSTRUCTIVE SLEEP APNEA SYNDROME: Status: ACTIVE | Noted: 2024-08-13

## 2024-10-02 PROBLEM — D57.3 SICKLE CELL TRAIT (HCC): Status: ACTIVE | Noted: 2024-10-02

## 2024-10-02 PROBLEM — R51.9 HEADACHE: Status: ACTIVE | Noted: 2024-10-02

## 2024-10-02 PROBLEM — K57.92 DIVERTICULITIS: Status: ACTIVE | Noted: 2024-10-02

## 2024-10-02 PROBLEM — R42 VERTIGO: Status: ACTIVE | Noted: 2024-10-02

## 2024-10-02 PROCEDURE — 3074F SYST BP LT 130 MM HG: CPT | Performed by: OTOLARYNGOLOGY

## 2024-10-02 PROCEDURE — 3078F DIAST BP <80 MM HG: CPT | Performed by: OTOLARYNGOLOGY

## 2024-10-02 PROCEDURE — 99204 OFFICE O/P NEW MOD 45 MIN: CPT | Performed by: OTOLARYNGOLOGY

## 2024-10-02 RX ORDER — ALENDRONATE SODIUM 70 MG/1
TABLET ORAL
COMMUNITY
Start: 2023-10-12 | End: 2024-10-03

## 2024-10-02 ASSESSMENT — SLEEP AND FATIGUE QUESTIONNAIRES
HOW LIKELY ARE YOU TO NOD OFF OR FALL ASLEEP IN A CAR, WHILE STOPPED FOR A FEW MINUTES IN TRAFFIC: WOULD NEVER DOZE
HOW LIKELY ARE YOU TO NOD OFF OR FALL ASLEEP WHILE SITTING INACTIVE IN A PUBLIC PLACE: MODERATE CHANCE OF DOZING
HOW LIKELY ARE YOU TO NOD OFF OR FALL ASLEEP WHEN YOU ARE A PASSENGER IN A CAR FOR AN HOUR WITHOUT A BREAK: MODERATE CHANCE OF DOZING
ESS TOTAL SCORE: 9
HOW LIKELY ARE YOU TO NOD OFF OR FALL ASLEEP WHILE SITTING QUIETLY AFTER LUNCH WITHOUT ALCOHOL: WOULD NEVER DOZE
HOW LIKELY ARE YOU TO NOD OFF OR FALL ASLEEP WHILE WATCHING TV: MODERATE CHANCE OF DOZING
HOW LIKELY ARE YOU TO NOD OFF OR FALL ASLEEP WHILE SITTING AND TALKING TO SOMEONE: WOULD NEVER DOZE
HOW LIKELY ARE YOU TO NOD OFF OR FALL ASLEEP WHILE LYING DOWN TO REST IN THE AFTERNOON WHEN CIRCUMSTANCES PERMIT: HIGH CHANCE OF DOZING
HOW LIKELY ARE YOU TO NOD OFF OR FALL ASLEEP WHILE SITTING AND READING: WOULD NEVER DOZE

## 2024-10-02 NOTE — PROGRESS NOTES
Vivek Raymond presents today for   Chief Complaint   Patient presents with    New Patient     Referred by Dr.Ryan Whitehead for sleep eval       Is someone accompanying this pt? No    Is the patient using any DME equipment during OV? No    -DME Company NA    Depression Screening:         No data to display                Learning Needs Questionnaire:     No question data found.      Fall Risk:          No data to display                 Abuse Screening:          No data to display                  Coordination of Care:    1. Have you been to the ER, urgent care clinic since your last visit? Hospitalized since your last visit? No    2. Have you seen or consulted any other health care providers outside of the [facility] since your last visit? Include any pap smears or colon screening. PCP    Medication list has been update per patient.

## 2024-10-16 ENCOUNTER — HOSPITAL ENCOUNTER (OUTPATIENT)
Dept: SLEEP MEDICINE | Facility: HOSPITAL | Age: 64
Discharge: HOME | End: 2024-10-19
Attending: OTOLARYNGOLOGY
Payer: OTHER GOVERNMENT

## 2024-10-16 DIAGNOSIS — R29.818 SUSPECTED SLEEP APNEA: ICD-10-CM

## 2024-10-16 DIAGNOSIS — R06.83 LOUD SNORING: ICD-10-CM

## 2024-10-16 PROCEDURE — 95810 POLYSOM 6/> YRS 4/> PARAM: CPT

## 2024-10-17 VITALS
DIASTOLIC BLOOD PRESSURE: 69 MMHG | WEIGHT: 170 LBS | HEIGHT: 66 IN | BODY MASS INDEX: 27.32 KG/M2 | HEART RATE: 77 BPM | SYSTOLIC BLOOD PRESSURE: 163 MMHG

## 2024-10-17 ASSESSMENT — SLEEP AND FATIGUE QUESTIONNAIRES
HOW LIKELY ARE YOU TO NOD OFF OR FALL ASLEEP WHILE SITTING INACTIVE IN A PUBLIC PLACE: HIGH CHANCE OF DOZING
HOW LIKELY ARE YOU TO NOD OFF OR FALL ASLEEP WHILE SITTING AND TALKING TO SOMEONE: WOULD NEVER DOZE
ESS TOTAL SCORE: 12
HOW LIKELY ARE YOU TO NOD OFF OR FALL ASLEEP IN A CAR, WHILE STOPPED FOR A FEW MINUTES IN TRAFFIC: WOULD NEVER DOZE
HOW LIKELY ARE YOU TO NOD OFF OR FALL ASLEEP WHILE SITTING QUIETLY AFTER LUNCH WITHOUT ALCOHOL: WOULD NEVER DOZE
HOW LIKELY ARE YOU TO NOD OFF OR FALL ASLEEP WHILE SITTING AND READING: MODERATE CHANCE OF DOZING
HOW LIKELY ARE YOU TO NOD OFF OR FALL ASLEEP WHILE LYING DOWN TO REST IN THE AFTERNOON WHEN CIRCUMSTANCES PERMIT: MODERATE CHANCE OF DOZING
HOW LIKELY ARE YOU TO NOD OFF OR FALL ASLEEP WHILE WATCHING TV: HIGH CHANCE OF DOZING
HOW LIKELY ARE YOU TO NOD OFF OR FALL ASLEEP WHEN YOU ARE A PASSENGER IN A CAR FOR AN HOUR WITHOUT A BREAK: MODERATE CHANCE OF DOZING

## 2024-10-25 PROBLEM — G47.10 HYPERSOMNIA: Status: ACTIVE | Noted: 2024-10-25

## 2024-10-31 ENCOUNTER — OFFICE VISIT (OUTPATIENT)
Age: 64
End: 2024-10-31
Payer: OTHER GOVERNMENT

## 2024-10-31 VITALS
HEART RATE: 81 BPM | WEIGHT: 170.8 LBS | BODY MASS INDEX: 27.45 KG/M2 | SYSTOLIC BLOOD PRESSURE: 122 MMHG | DIASTOLIC BLOOD PRESSURE: 80 MMHG | OXYGEN SATURATION: 98 % | TEMPERATURE: 98 F | HEIGHT: 66 IN

## 2024-10-31 DIAGNOSIS — G43.009 MIGRAINE WITHOUT AURA AND WITHOUT STATUS MIGRAINOSUS, NOT INTRACTABLE: Primary | ICD-10-CM

## 2024-10-31 DIAGNOSIS — H81.10 BENIGN PAROXYSMAL POSITIONAL VERTIGO, UNSPECIFIED LATERALITY: ICD-10-CM

## 2024-10-31 PROCEDURE — 3079F DIAST BP 80-89 MM HG: CPT | Performed by: STUDENT IN AN ORGANIZED HEALTH CARE EDUCATION/TRAINING PROGRAM

## 2024-10-31 PROCEDURE — 99213 OFFICE O/P EST LOW 20 MIN: CPT | Performed by: STUDENT IN AN ORGANIZED HEALTH CARE EDUCATION/TRAINING PROGRAM

## 2024-10-31 PROCEDURE — 3074F SYST BP LT 130 MM HG: CPT | Performed by: STUDENT IN AN ORGANIZED HEALTH CARE EDUCATION/TRAINING PROGRAM

## 2024-10-31 RX ORDER — FEXOFENADINE HCL 180 MG/1
TABLET ORAL
COMMUNITY
Start: 2024-03-13 | End: 2025-03-13

## 2024-10-31 ASSESSMENT — ENCOUNTER SYMPTOMS
BACK PAIN: 1
NAUSEA: 0
VOMITING: 0
SHORTNESS OF BREATH: 0
COUGH: 0

## 2024-10-31 NOTE — PROGRESS NOTES
unspecified laterality  No recurrence of the BPPV symptoms.  No focal neurodeficits currently.    Follow-up in 3 months time.    I spent 20 minutes with the patient in face-to-face consultation, with more than 10 minutes spent in counseling and coordination of care as described above.     PLEASE NOTE:   This document has been produced using voice recognition software. Unrecognized errors in transcription may be present.

## 2024-11-04 ENCOUNTER — OFFICE VISIT (OUTPATIENT)
Age: 64
End: 2024-11-04
Payer: OTHER GOVERNMENT

## 2024-11-04 VITALS
HEIGHT: 66 IN | SYSTOLIC BLOOD PRESSURE: 145 MMHG | HEART RATE: 89 BPM | BODY MASS INDEX: 27.48 KG/M2 | DIASTOLIC BLOOD PRESSURE: 81 MMHG | RESPIRATION RATE: 18 BRPM | TEMPERATURE: 97.7 F | OXYGEN SATURATION: 96 % | WEIGHT: 171 LBS

## 2024-11-04 DIAGNOSIS — I10 ESSENTIAL HYPERTENSION: ICD-10-CM

## 2024-11-04 DIAGNOSIS — G47.63 BRUXISM, SLEEP-RELATED: ICD-10-CM

## 2024-11-04 DIAGNOSIS — G43.009 MIGRAINE WITHOUT AURA AND WITHOUT STATUS MIGRAINOSUS, NOT INTRACTABLE: ICD-10-CM

## 2024-11-04 DIAGNOSIS — G47.8 NON-RESTORATIVE SLEEP: ICD-10-CM

## 2024-11-04 DIAGNOSIS — G47.33 OSA (OBSTRUCTIVE SLEEP APNEA): ICD-10-CM

## 2024-11-04 DIAGNOSIS — G47.19 EXCESSIVE DAYTIME SLEEPINESS: Primary | ICD-10-CM

## 2024-11-04 PROCEDURE — 3079F DIAST BP 80-89 MM HG: CPT | Performed by: OTOLARYNGOLOGY

## 2024-11-04 PROCEDURE — 99214 OFFICE O/P EST MOD 30 MIN: CPT | Performed by: OTOLARYNGOLOGY

## 2024-11-04 PROCEDURE — 3077F SYST BP >= 140 MM HG: CPT | Performed by: OTOLARYNGOLOGY

## 2024-11-04 ASSESSMENT — SLEEP AND FATIGUE QUESTIONNAIRES
ESS TOTAL SCORE: 7
HOW LIKELY ARE YOU TO NOD OFF OR FALL ASLEEP WHEN YOU ARE A PASSENGER IN A CAR FOR AN HOUR WITHOUT A BREAK: HIGH CHANCE OF DOZING
HOW LIKELY ARE YOU TO NOD OFF OR FALL ASLEEP WHILE SITTING AND READING: WOULD NEVER DOZE
HOW LIKELY ARE YOU TO NOD OFF OR FALL ASLEEP WHILE WATCHING TV: SLIGHT CHANCE OF DOZING
HOW LIKELY ARE YOU TO NOD OFF OR FALL ASLEEP WHILE SITTING QUIETLY AFTER LUNCH WITHOUT ALCOHOL: WOULD NEVER DOZE
HOW LIKELY ARE YOU TO NOD OFF OR FALL ASLEEP WHILE SITTING AND TALKING TO SOMEONE: WOULD NEVER DOZE
HOW LIKELY ARE YOU TO NOD OFF OR FALL ASLEEP IN A CAR, WHILE STOPPED FOR A FEW MINUTES IN TRAFFIC: WOULD NEVER DOZE
HOW LIKELY ARE YOU TO NOD OFF OR FALL ASLEEP WHILE LYING DOWN TO REST IN THE AFTERNOON WHEN CIRCUMSTANCES PERMIT: MODERATE CHANCE OF DOZING
HOW LIKELY ARE YOU TO NOD OFF OR FALL ASLEEP WHILE SITTING INACTIVE IN A PUBLIC PLACE: SLIGHT CHANCE OF DOZING

## 2024-11-04 ASSESSMENT — PATIENT HEALTH QUESTIONNAIRE - PHQ9
SUM OF ALL RESPONSES TO PHQ QUESTIONS 1-9: 0
SUM OF ALL RESPONSES TO PHQ QUESTIONS 1-9: 0
1. LITTLE INTEREST OR PLEASURE IN DOING THINGS: NOT AT ALL
SUM OF ALL RESPONSES TO PHQ QUESTIONS 1-9: 0
SUM OF ALL RESPONSES TO PHQ9 QUESTIONS 1 & 2: 0
2. FEELING DOWN, DEPRESSED OR HOPELESS: NOT AT ALL
SUM OF ALL RESPONSES TO PHQ QUESTIONS 1-9: 0

## 2024-11-04 NOTE — PATIENT INSTRUCTIONS
Please make a follow up appointment to discuss the results of your sleep study. If this is impossible for some reason, please send me a \"My Chart\" message so that I may get back with you in a timely manner.    The [facility] Sleep Lab is located in the "[employer] [facility], adjacent to [facility]. The lab is on the second floor. The direct number to call for sleep study related questions is: [phone].    Please call our clinic back at [phone] or send a message on [employer] if you have any questions or concerns or if you are experiencing any of the following:     You have not received a follow up appointment within 30 days prior the recommended follow up time.    If you are not tolerating treatment plan and/or not able to obtain equipment or prescribed medication(s).  if you are experiencing any difficulties with the Durable Medical Equipment  (DME) Company you may be using or is assigned to you.  Two weeks have passed and you have not received an appointment for a scheduled procedure.  Two weeks have passed since you underwent a test and/or procedure and you have not received your results.     If you are using a CPAP/BIPAP, or Home Ventilator Device- Please note the following.  Currently, many DMEs are experiencing supply chain difficulties and orders for equipment may be back logged several weeks.     Your  Durable Medical Equipment (DME ) company is supposed to provide you with replacement filters, tubing and masks. You can either call your DME when you need new supplies or you can arrange for an automatic shipment schedule.    Your need to be seen by our office at lat minimum of every 12 months in order to renew the prescription for these supplies.   Please make note of who your DME company is and their phone number.   Please make sure that you clean your mask and hosing on a regular basis.  Your DME can provide you with additional information regarding proper care and cleaning of your

## 2024-11-04 NOTE — ASSESSMENT & PLAN NOTE
Mild with an AHI of 13.1, became severe in REM with an AHI of 49.  Also noted was short sleep latency of 6.5 minutes and sleep fragmentation.

## 2024-11-05 ENCOUNTER — CLINICAL DOCUMENTATION (OUTPATIENT)
Age: 64
End: 2024-11-05

## 2025-02-03 ENCOUNTER — OFFICE VISIT (OUTPATIENT)
Age: 65
End: 2025-02-03
Payer: OTHER GOVERNMENT

## 2025-02-03 VITALS
HEIGHT: 66 IN | WEIGHT: 169 LBS | BODY MASS INDEX: 27.16 KG/M2 | SYSTOLIC BLOOD PRESSURE: 130 MMHG | DIASTOLIC BLOOD PRESSURE: 78 MMHG | OXYGEN SATURATION: 98 % | HEART RATE: 104 BPM

## 2025-02-03 DIAGNOSIS — G43.009 MIGRAINE WITHOUT AURA AND WITHOUT STATUS MIGRAINOSUS, NOT INTRACTABLE: Primary | ICD-10-CM

## 2025-02-03 PROCEDURE — 3075F SYST BP GE 130 - 139MM HG: CPT | Performed by: STUDENT IN AN ORGANIZED HEALTH CARE EDUCATION/TRAINING PROGRAM

## 2025-02-03 PROCEDURE — 99213 OFFICE O/P EST LOW 20 MIN: CPT | Performed by: STUDENT IN AN ORGANIZED HEALTH CARE EDUCATION/TRAINING PROGRAM

## 2025-02-03 PROCEDURE — 3078F DIAST BP <80 MM HG: CPT | Performed by: STUDENT IN AN ORGANIZED HEALTH CARE EDUCATION/TRAINING PROGRAM

## 2025-02-03 ASSESSMENT — ENCOUNTER SYMPTOMS
BACK PAIN: 1
NAUSEA: 0
SHORTNESS OF BREATH: 0
VOMITING: 0
COUGH: 0

## 2025-02-03 NOTE — PROGRESS NOTES
Vivek Raymond is a 64 y.o. male .presents for Follow-up (Migraines)    A 64 years old male patient here for follow-up of headache and dizziness/vertigo.  Last seen in the clinic in October 2024.  On amitriptyline 25 mg p.o. nightly; has sumatriptan 50 mg for acute attacks.  No worsening headache frequency.  Currently getting about 2 headaches per month.  Gets better with Imitrex.  Headache might last for a couple of hours.  Not associated with nausea or vomiting.  Has mild photophobia.  No phonophobia.  No major side effect from the amitriptyline.  No recent vertiginous symptoms.    From initial encounter:  A 64 years old male patient with medical history of hypertension and hyperlipidemia referred here for evaluation of headache and previous episode of vertigo.  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  Headaches are intermittent.  Occasionally, might have headaches multiple times a week; sometimes might not have any headaches for a while.  On average, he has 5 headaches per month.  Duration is variable; might last for few hours; sometimes might persist when he wakes up from his sleep.  No obvious triggers.  Headaches are bilateral, throbbing, 7-8/10 in severity.  He wants to be by himself when he is having headaches.  Has photophobia.  No phonophobia.  Sometimes, might get nauseous.  No vomiting.  No preceding aura.  No previous preventative medications.  Takes naproxen for acute attacks; might help.  Previously, he used to take ibuprofen.  Never been on triptans.  About 3 years ago, had an episode of vertigo which lasted for about 5 days.  Had a spinning sensation associated with nausea; no vomiting.  Was a little unsteady.  He mentioned was dehydrated the previous day before the onset of his symptoms; had a little diarrhea.  Was seen in the emergency room.  He was told to do some exercises at home.  Was difficult to drive for a while.  Did not have any earache,

## 2025-02-03 NOTE — PROGRESS NOTES
Vivek Raymond is a 64 y.o. male (: 1960) presenting to address:    Chief Complaint   Patient presents with    Follow-up     Migraines       Medication list and allergies have been reviewed with Vivek KATHARINA Segundo and updated as of today's date.     I have gone over all Medical, Surgical and Social History with Vivek Raymond and updated/added the information accordingly.       1. Have you been to the ER, Urgent Care or Hospitalized since your last visit? No      2. Have you followed up with your PCP or any other Physicians since your procedure/ last office visit?   Yes

## 2025-02-18 ASSESSMENT — SLEEP AND FATIGUE QUESTIONNAIRES
HOW LIKELY ARE YOU TO NOD OFF OR FALL ASLEEP WHILE SITTING AND TALKING TO SOMEONE: WOULD NEVER DOZE
HOW LIKELY ARE YOU TO NOD OFF OR FALL ASLEEP WHILE WATCHING TV: MODERATE CHANCE OF DOZING
HOW LIKELY ARE YOU TO NOD OFF OR FALL ASLEEP WHILE SITTING INACTIVE IN A PUBLIC PLACE: SLIGHT CHANCE OF DOZING
HOW LIKELY ARE YOU TO NOD OFF OR FALL ASLEEP WHEN YOU ARE A PASSENGER IN A CAR FOR AN HOUR WITHOUT A BREAK: SLIGHT CHANCE OF DOZING
ESS TOTAL SCORE: 6
HOW LIKELY ARE YOU TO NOD OFF OR FALL ASLEEP WHILE LYING DOWN TO REST IN THE AFTERNOON WHEN CIRCUMSTANCES PERMIT: SLIGHT CHANCE OF DOZING
HOW LIKELY ARE YOU TO NOD OFF OR FALL ASLEEP IN A CAR, WHILE STOPPED FOR A FEW MINUTES IN TRAFFIC: WOULD NEVER DOZE
HOW LIKELY ARE YOU TO NOD OFF OR FALL ASLEEP WHILE SITTING QUIETLY AFTER LUNCH WITHOUT ALCOHOL: WOULD NEVER DOZE
HOW LIKELY ARE YOU TO NOD OFF OR FALL ASLEEP WHILE SITTING AND READING: SLIGHT CHANCE OF DOZING

## 2025-02-22 ASSESSMENT — SLEEP AND FATIGUE QUESTIONNAIRES
HOW LIKELY ARE YOU TO NOD OFF OR FALL ASLEEP WHILE WATCHING TV: MODERATE CHANCE OF DOZING
HOW LIKELY ARE YOU TO NOD OFF OR FALL ASLEEP WHILE WATCHING TV: MODERATE CHANCE OF DOZING
HOW LIKELY ARE YOU TO NOD OFF OR FALL ASLEEP WHILE LYING DOWN TO REST IN THE AFTERNOON WHEN CIRCUMSTANCES PERMIT: MODERATE CHANCE OF DOZING
HOW LIKELY ARE YOU TO NOD OFF OR FALL ASLEEP WHILE SITTING QUIETLY AFTER LUNCH WITHOUT ALCOHOL: WOULD NEVER DOZE
HOW LIKELY ARE YOU TO NOD OFF OR FALL ASLEEP WHEN YOU ARE A PASSENGER IN A CAR FOR AN HOUR WITHOUT A BREAK: SLIGHT CHANCE OF DOZING
HOW LIKELY ARE YOU TO NOD OFF OR FALL ASLEEP WHILE SITTING AND TALKING TO SOMEONE: WOULD NEVER DOZE
HOW LIKELY ARE YOU TO NOD OFF OR FALL ASLEEP IN A CAR, WHILE STOPPED FOR A FEW MINUTES IN TRAFFIC: WOULD NEVER DOZE
HOW LIKELY ARE YOU TO NOD OFF OR FALL ASLEEP WHILE SITTING QUIETLY AFTER LUNCH WITHOUT ALCOHOL: WOULD NEVER DOZE
HOW LIKELY ARE YOU TO NOD OFF OR FALL ASLEEP WHILE SITTING AND READING: SLIGHT CHANCE OF DOZING
HOW LIKELY ARE YOU TO NOD OFF OR FALL ASLEEP WHILE SITTING INACTIVE IN A PUBLIC PLACE: SLIGHT CHANCE OF DOZING
HOW LIKELY ARE YOU TO NOD OFF OR FALL ASLEEP WHEN YOU ARE A PASSENGER IN A CAR FOR AN HOUR WITHOUT A BREAK: SLIGHT CHANCE OF DOZING
ESS TOTAL SCORE: 7
HOW LIKELY ARE YOU TO NOD OFF OR FALL ASLEEP WHILE SITTING AND TALKING TO SOMEONE: WOULD NEVER DOZE
HOW LIKELY ARE YOU TO NOD OFF OR FALL ASLEEP IN A CAR, WHILE STOPPED FOR A FEW MINUTES IN TRAFFIC: WOULD NEVER DOZE
HOW LIKELY ARE YOU TO NOD OFF OR FALL ASLEEP WHILE LYING DOWN TO REST IN THE AFTERNOON WHEN CIRCUMSTANCES PERMIT: MODERATE CHANCE OF DOZING
HOW LIKELY ARE YOU TO NOD OFF OR FALL ASLEEP WHILE SITTING AND READING: SLIGHT CHANCE OF DOZING
HOW LIKELY ARE YOU TO NOD OFF OR FALL ASLEEP WHILE SITTING INACTIVE IN A PUBLIC PLACE: SLIGHT CHANCE OF DOZING

## 2025-02-24 NOTE — ASSESSMENT & PLAN NOTE
Mild with an AHI of 13.1, became severe in REM with an AHI of 49.  Also noted was short sleep latency of 6.5 minutes and sleep fragmentation.     The patient got his current CPAP machine November 21, 2024.  Usage for more than 4 hours is 100%.  Residual AHI is low at 0.6 and is using his machine between 7 and 8 hours per night.  Settings appear to be appropriate as well.    I will place an Rx/order for CPAP supplies and we will continue the current settings on his machine.  I need to see him back in routine follow-up in 1 year

## 2025-02-24 NOTE — PROGRESS NOTES
[facility]   Sleep Medicine     Office Progress Note         [address]  [address]  [facility] [id number] [phone] Fax    Reason for visit/referral:  F/u Obstructive Sleep Apnea on CPAP  Assessment:      1. JUAN MANUEL (obstructive sleep apnea)  Assessment & Plan:  Mild with an AHI of 13.1, became severe in REM with an AHI of 49.  Also noted was short sleep latency of 6.5 minutes and sleep fragmentation.     The patient got his current CPAP machine November 21, 2024.  Usage for more than 4 hours is 100%.  Residual AHI is low at 0.6 and is using his machine between 7 and 8 hours per night.  Settings appear to be appropriate as well.    I will place an Rx/order for CPAP supplies and we will continue the current settings on his machine.  I need to see him back in routine follow-up in 1 year    Orders:  -     DME - DURABLE MEDICAL EQUIPMENT  2. Excessive daytime sleepiness  Comments:  Under excellent control, [place] is gone from 12/24 to 7/24.  3. Essential hypertension  Assessment & Plan:   Chronic, at goal (stable), continue current treatment plan  4. Bruxism, sleep-related  5. Non-restorative sleep      Patient reports he is doing well.    No complaints today.    He continues to due very well with PAP therapy and reports on going clinical benefit.    No aerophagia, or bloating    Mask- No skin irritation    He is using a nasal mask    He is able to get his supplies without issue    PAP Hygiene: Cleans at least once weekly with soap and water      The patient says he wakes up feeling refreshed, is getting better quality sleep and is quite happy with his CPAP use.     Prior discussion:  We discussed the patient's sleep study results in detail and I gave him a copy of his study for his  medical records.  He does have mild obstructive sleep apnea, severe and REM.  I think would be reasonable to treat this and see how his excessive daytime somnolence improves.     We

## 2025-02-25 ENCOUNTER — OFFICE VISIT (OUTPATIENT)
Age: 65
End: 2025-02-25
Payer: OTHER GOVERNMENT

## 2025-02-25 ENCOUNTER — CLINICAL DOCUMENTATION (OUTPATIENT)
Age: 65
End: 2025-02-25

## 2025-02-25 VITALS
HEIGHT: 66 IN | RESPIRATION RATE: 18 BRPM | SYSTOLIC BLOOD PRESSURE: 120 MMHG | HEART RATE: 94 BPM | DIASTOLIC BLOOD PRESSURE: 53 MMHG | OXYGEN SATURATION: 95 % | WEIGHT: 170 LBS | TEMPERATURE: 98 F | BODY MASS INDEX: 27.32 KG/M2

## 2025-02-25 DIAGNOSIS — G47.8 NON-RESTORATIVE SLEEP: ICD-10-CM

## 2025-02-25 DIAGNOSIS — G47.33 OSA (OBSTRUCTIVE SLEEP APNEA): Primary | ICD-10-CM

## 2025-02-25 DIAGNOSIS — G47.19 EXCESSIVE DAYTIME SLEEPINESS: ICD-10-CM

## 2025-02-25 DIAGNOSIS — I10 ESSENTIAL HYPERTENSION: ICD-10-CM

## 2025-02-25 PROCEDURE — 99214 OFFICE O/P EST MOD 30 MIN: CPT | Performed by: OTOLARYNGOLOGY

## 2025-02-25 PROCEDURE — 3078F DIAST BP <80 MM HG: CPT | Performed by: OTOLARYNGOLOGY

## 2025-02-25 PROCEDURE — 3074F SYST BP LT 130 MM HG: CPT | Performed by: OTOLARYNGOLOGY

## 2025-02-25 ASSESSMENT — PATIENT HEALTH QUESTIONNAIRE - PHQ9
SUM OF ALL RESPONSES TO PHQ QUESTIONS 1-9: 0
SUM OF ALL RESPONSES TO PHQ QUESTIONS 1-9: 0
2. FEELING DOWN, DEPRESSED OR HOPELESS: NOT AT ALL
1. LITTLE INTEREST OR PLEASURE IN DOING THINGS: NOT AT ALL
SUM OF ALL RESPONSES TO PHQ9 QUESTIONS 1 & 2: 0
SUM OF ALL RESPONSES TO PHQ QUESTIONS 1-9: 0
SUM OF ALL RESPONSES TO PHQ QUESTIONS 1-9: 0

## 2025-02-25 NOTE — PROGRESS NOTES
Vivek Raymond presents today for   Chief Complaint   Patient presents with    Follow-up     CPAP       Is someone accompanying this pt? no    Is the patient using any DME equipment during OV? no    -DME Company: [employer]     Depression Screenin/25/2025     2:51 PM   PHQ-9    Little interest or pleasure in doing things 0   Feeling down, depressed, or hopeless 0   PHQ-2 Score 0   PHQ-9 Total Score 0        [place] Sleepiness Scale:      2025    12:05 PM   Sleep Medicine   Sitting and reading 1   Watching TV 2   Sitting, inactive in a public place (e.g. a theatre or a meeting) 1   As a passenger in a car for an hour without a break 1   Lying down to rest in the afternoon when circumstances permit 2   Sitting and talking to someone 0   Sitting quietly after a lunch without alcohol 0   In a car, while stopped for a few minutes in traffic 0   [place] Sleepiness Score 7           Coordination of Care:  1. Have you been to the ER, urgent care clinic since your last visit? Hospitalized since your last visit?  no    2. Have you seen or consulted any other health care providers outside of the [facility] since your last visit? Include any pap smears or colon screening. no    Medication list has been updated according to patient.

## 2025-02-25 NOTE — PATIENT INSTRUCTIONS
Please make a follow up appointment to discuss the results of your sleep study. If this is impossible for some reason, please send me a \"My Chart\" message so that I may get back with you in a timely manner.    The [facility] is located in the [facility], adjacent to [facility]. The lab is on the second floor. The direct number to call for sleep study related questions is: [phone].    Please call our clinic back at [phone] or send a message on [employer] if you have any questions or concerns or if you are experiencing any of the following:     You have not received a follow up appointment within 30 days prior the recommended follow up time.    If you are not tolerating treatment plan and/or not able to obtain equipment or prescribed medication(s).  if you are experiencing any difficulties with the Durable Medical Equipment  (DME) Company you may be using or is assigned to you.  Two weeks have passed and you have not received an appointment for a scheduled procedure.  Two weeks have passed since you underwent a test and/or procedure and you have not received your results.     If you are using a CPAP/BIPAP, or Home Ventilator Device- Please note the following.  Currently, many DMEs are experiencing supply chain difficulties and orders for equipment may be back logged several weeks.     Your  Durable Medical Equipment (DME ) company is supposed to provide you with replacement filters, tubing and masks. You can either call your DME when you need new supplies or you can arrange for an automatic shipment schedule.    Your need to be seen by our office at lat minimum of every 12 months in order to renew the prescription for these supplies.   Please make note of who your DME company is and their phone number.   Please make sure that you clean your mask and hosing on a regular basis.  Your DME can provide you with additional information regarding proper care and cleaning of your

## 2025-05-09 NOTE — PROGRESS NOTES
[facility]   Sleep Medicine     Office Progress Note         [address]  [address]  [facility] [id number] [phone] Fax    Reason for visit/referral:  F/u PSG   Assessment:      1. Excessive daytime sleepiness  2. JUAN MANUEL (obstructive sleep apnea)  Assessment & Plan:   Mild with an AHI of 13.1, became severe in REM with an AHI of 49.  Also noted was short sleep latency of 6.5 minutes and sleep fragmentation.  Orders:  -     DME - DURABLE MEDICAL EQUIPMENT  3. Essential hypertension  4. Non-restorative sleep  5. Migraine without aura and without status migrainosus, not intractable  Assessment & Plan:   Monitored by specialist- no acute findings meriting change in the plan  6. Bruxism, sleep-related       We discussed the patient's sleep study results in detail and I gave him a copy of his study for his  medical records.  He does have mild obstructive sleep apnea, severe and REM.  I think would be reasonable to treat this and see how his excessive daytime somnolence improves.     We discussed different options today to include CPAP/APAP, oral appliances, surgical options if indicated.  He certainly does not need to lose weight with a BMI of 27.  He stated that he would like to try CPAP/APAP for 6 weeks and then follow-up in the office.     Prior discussion:  I have discussed the nature and definition of obstructive sleep apnea and why it would be important to evaluate for this.  We did discuss how undiagnosed/untreated obstructive sleep apnea can affect other medical conditions/disorders, and in particular, cardiovascular disorders.  The consequences of untreated obstructive sleep apnea include the increased risk of stroke, heart disease, hypertension, cognitive difficulties (attention, memory), possible endocrine difficulties to include insulin resistance and possible increased risk of diabetes, increased sleepiness and fatigue and increased risk of motor 
Vivek Raymond presents today for   Chief Complaint   Patient presents with    Follow-up    Results       Is someone accompanying this pt? no    Is the patient using any DME equipment during OV? no    -DME Company: N/A    Depression Screenin/4/2024     2:30 PM   PHQ-9    Little interest or pleasure in doing things 0   Feeling down, depressed, or hopeless 0   PHQ-2 Score 0   PHQ-9 Total Score 0        [place] Sleepiness Scale:      2024     2:32 PM   Sleep Medicine   Sitting and reading 0   Watching TV 1   Sitting, inactive in a public place (e.g. a theatre or a meeting) 1   As a passenger in a car for an hour without a break 3   Lying down to rest in the afternoon when circumstances permit 2   Sitting and talking to someone 0   Sitting quietly after a lunch without alcohol 0   In a car, while stopped for a few minutes in traffic 0   [place] Sleepiness Score 7       Coordination of Care:  1. Have you been to the ER, urgent care clinic since your last visit? Hospitalized since your last visit?  no    2. Have you seen or consulted any other health care providers outside of the [facility] since your last visit? Include any pap smears or colon screening. no    Medication list has been updated according to patient.  
no

## 2025-08-04 ENCOUNTER — OFFICE VISIT (OUTPATIENT)
Age: 65
End: 2025-08-04
Payer: OTHER GOVERNMENT

## 2025-08-04 VITALS
OXYGEN SATURATION: 100 % | WEIGHT: 172 LBS | HEART RATE: 80 BPM | DIASTOLIC BLOOD PRESSURE: 70 MMHG | BODY MASS INDEX: 27.64 KG/M2 | SYSTOLIC BLOOD PRESSURE: 125 MMHG | HEIGHT: 66 IN | TEMPERATURE: 97.3 F

## 2025-08-04 DIAGNOSIS — G43.009 MIGRAINE WITHOUT AURA AND WITHOUT STATUS MIGRAINOSUS, NOT INTRACTABLE: Primary | ICD-10-CM

## 2025-08-04 PROCEDURE — 3074F SYST BP LT 130 MM HG: CPT | Performed by: STUDENT IN AN ORGANIZED HEALTH CARE EDUCATION/TRAINING PROGRAM

## 2025-08-04 PROCEDURE — 3078F DIAST BP <80 MM HG: CPT | Performed by: STUDENT IN AN ORGANIZED HEALTH CARE EDUCATION/TRAINING PROGRAM

## 2025-08-04 PROCEDURE — 99213 OFFICE O/P EST LOW 20 MIN: CPT | Performed by: STUDENT IN AN ORGANIZED HEALTH CARE EDUCATION/TRAINING PROGRAM

## 2025-08-04 ASSESSMENT — ENCOUNTER SYMPTOMS
SHORTNESS OF BREATH: 0
VOMITING: 0
NAUSEA: 0
COUGH: 0
BACK PAIN: 0